# Patient Record
(demographics unavailable — no encounter records)

---

## 2025-01-25 NOTE — PHYSICAL EXAM
[Chaperone Present] : A chaperone was present in the examining room during all aspects of the physical examination [Appropriately responsive] : appropriately responsive [Alert] : alert [No Lymphadenopathy] : no lymphadenopathy [Soft] : soft [Non-tender] : non-tender [Non-distended] : non-distended [Oriented x3] : oriented x3 [Vulvar Atrophy] : vulvar atrophy [Uterine Prolapse] : uterine prolapse [No Bleeding] : There was no active vaginal bleeding [Normal] : normal [FreeTextEntry6] : Total prolapse

## 2025-01-25 NOTE — COUNSELING
[Nutrition/ Exercise/ Weight Management] : nutrition, exercise, weight management [Vitamins/Supplements] : vitamins/supplements [Breast Self Exam] : breast self exam [Bladder Hygiene] : bladder hygiene [FreeTextEntry2] : [facility]

## 2025-07-09 NOTE — HISTORY OF PRESENT ILLNESS
[FreeTextEntry1] : 77 yo presents for follow up for UTI and pessary care.  Pt took medications w/o side effects.

## 2025-07-09 NOTE — PHYSICAL EXAM
[MA] : MA [Soft] : soft [Non-tender] : non-tender [Non-distended] : non-distended [No HSM] : No HSM [No Lesions] : no lesions [No Mass] : no mass [Oriented x3] : oriented x3 [Vulvar Atrophy] : vulvar atrophy [Atrophy] : atrophy [Cystocele] : a cystocele [Rectocele] : a rectocele [Uterine Prolapse] : uterine prolapse [No Bleeding] : There was no active vaginal bleeding [Uterine Adnexae] : non-palpable [FreeTextEntry1] : Arelis Matias

## 2025-07-09 NOTE — REASON FOR VISIT
[Follow-Up] : a follow-up evaluation of [Prolapse] : prolapse [Vulvar/Vaginal Complaint] : vulvar/vaginal complaint